# Patient Record
Sex: FEMALE | Race: ASIAN
[De-identification: names, ages, dates, MRNs, and addresses within clinical notes are randomized per-mention and may not be internally consistent; named-entity substitution may affect disease eponyms.]

---

## 2017-05-20 ENCOUNTER — HOSPITAL ENCOUNTER (EMERGENCY)
Dept: HOSPITAL 4 - SED | Age: 82
Discharge: LEFT BEFORE DISCHARGE | End: 2017-05-20
Payer: COMMERCIAL

## 2017-05-20 VITALS — BODY MASS INDEX: 18.65 KG/M2 | WEIGHT: 95 LBS | HEIGHT: 60 IN

## 2017-05-20 VITALS
RESPIRATION RATE: 18 BRPM | OXYGEN SATURATION: 96 % | SYSTOLIC BLOOD PRESSURE: 106 MMHG | HEART RATE: 107 BPM | DIASTOLIC BLOOD PRESSURE: 48 MMHG | TEMPERATURE: 97.4 F

## 2017-05-20 DIAGNOSIS — E11.9: ICD-10-CM

## 2017-05-20 DIAGNOSIS — J44.9: ICD-10-CM

## 2017-05-20 DIAGNOSIS — R53.1: Primary | ICD-10-CM

## 2017-05-20 DIAGNOSIS — Z90.49: ICD-10-CM

## 2018-08-20 ENCOUNTER — HOSPITAL ENCOUNTER (INPATIENT)
Dept: HOSPITAL 4 - SED | Age: 83
LOS: 1 days | Discharge: HOME | DRG: 189 | End: 2018-08-21
Attending: INTERNAL MEDICINE | Admitting: INTERNAL MEDICINE
Payer: COMMERCIAL

## 2018-08-20 VITALS — SYSTOLIC BLOOD PRESSURE: 169 MMHG

## 2018-08-20 VITALS — SYSTOLIC BLOOD PRESSURE: 121 MMHG

## 2018-08-20 VITALS — WEIGHT: 114 LBS | BODY MASS INDEX: 21.52 KG/M2 | HEIGHT: 61 IN

## 2018-08-20 VITALS — SYSTOLIC BLOOD PRESSURE: 126 MMHG

## 2018-08-20 DIAGNOSIS — I11.0: ICD-10-CM

## 2018-08-20 DIAGNOSIS — N39.0: ICD-10-CM

## 2018-08-20 DIAGNOSIS — F03.90: ICD-10-CM

## 2018-08-20 DIAGNOSIS — F32.9: ICD-10-CM

## 2018-08-20 DIAGNOSIS — R65.11: ICD-10-CM

## 2018-08-20 DIAGNOSIS — H91.90: ICD-10-CM

## 2018-08-20 DIAGNOSIS — J96.01: Primary | ICD-10-CM

## 2018-08-20 DIAGNOSIS — J44.1: ICD-10-CM

## 2018-08-20 DIAGNOSIS — Z79.899: ICD-10-CM

## 2018-08-20 DIAGNOSIS — E11.9: ICD-10-CM

## 2018-08-20 DIAGNOSIS — I50.9: ICD-10-CM

## 2018-08-20 DIAGNOSIS — J84.9: ICD-10-CM

## 2018-08-20 DIAGNOSIS — R09.02: ICD-10-CM

## 2018-08-20 DIAGNOSIS — E86.0: ICD-10-CM

## 2018-08-20 LAB
ALBUMIN SERPL BCP-MCNC: 3.3 G/DL (ref 3.4–4.8)
ALT SERPL W P-5'-P-CCNC: 21 U/L (ref 12–78)
ANION GAP SERPL CALCULATED.3IONS-SCNC: 9 MMOL/L (ref 5–15)
APPEARANCE UR: (no result)
AST SERPL W P-5'-P-CCNC: 29 U/L (ref 10–37)
BACTERIA URNS QL MICRO: (no result) /HPF
BASOPHILS # BLD AUTO: 0.1 K/UL (ref 0–0.2)
BASOPHILS NFR BLD AUTO: 0.6 % (ref 0–2)
BILIRUB SERPL-MCNC: 0.2 MG/DL (ref 0–1)
BILIRUB UR QL STRIP: NEGATIVE
BUN SERPL-MCNC: 43 MG/DL (ref 8–21)
CALCIUM SERPL-MCNC: 8.5 MG/DL (ref 8.4–11)
CHLORIDE SERPL-SCNC: 104 MMOL/L (ref 98–107)
COLOR UR: YELLOW
CREAT SERPL-MCNC: 2.25 MG/DL (ref 0.55–1.3)
EOSINOPHIL # BLD AUTO: 0.2 K/UL (ref 0–0.4)
EOSINOPHIL NFR BLD AUTO: 1.8 % (ref 0–4)
ERYTHROCYTE [DISTWIDTH] IN BLOOD BY AUTOMATED COUNT: 12.6 % (ref 9–15)
FINE GRAN CASTS URNS QL MICRO: (no result) /LPF
GFR SERPL CREATININE-BSD FRML MDRD: (no result) ML/MIN (ref 90–?)
GLUCOSE SERPL-MCNC: 236 MG/DL (ref 70–99)
GLUCOSE UR STRIP-MCNC: (no result) MG/DL
HCT VFR BLD AUTO: 33.4 % (ref 36–48)
HGB BLD-MCNC: 10.8 G/DL (ref 12–16)
HGB UR QL STRIP: (no result)
INR PPP: 1 (ref 0.8–1.2)
KETONES UR STRIP-MCNC: NEGATIVE MG/DL
LEUKOCYTE ESTERASE UR QL STRIP: (no result)
LYMPHOCYTES # BLD AUTO: 0.9 K/UL (ref 1–5.5)
LYMPHOCYTES NFR BLD AUTO: 8.5 % (ref 20.5–51.5)
MCH RBC QN AUTO: 27 PG (ref 27–31)
MCHC RBC AUTO-ENTMCNC: 32 % (ref 32–36)
MCV RBC AUTO: 83 FL (ref 79–98)
MONOCYTES # BLD MANUAL: 0.5 K/UL (ref 0–1)
MONOCYTES # BLD MANUAL: 5.3 % (ref 1.7–9.3)
NEUTROPHILS # BLD AUTO: 8.7 K/UL (ref 1.8–7.7)
NEUTROPHILS NFR BLD AUTO: 83.8 % (ref 40–70)
NITRITE UR QL STRIP: NEGATIVE
PH UR STRIP: 6 [PH] (ref 5–8)
PLATELET # BLD AUTO: 325 K/UL (ref 130–430)
POTASSIUM SERPL-SCNC: 4.6 MMOL/L (ref 3.5–5.1)
PROT UR QL STRIP: (no result)
PROTHROMBIN TIME: 10.3 SECS (ref 9.5–12.5)
RBC # BLD AUTO: 4.01 MIL/UL (ref 4.2–6.2)
SODIUM SERPLBLD-SCNC: 137 MMOL/L (ref 136–145)
SP GR UR STRIP: 1.02 (ref 1–1.03)
UROBILINOGEN UR STRIP-MCNC: 0.2 MG/DL (ref 0.2–1)
WBC # BLD AUTO: 10.4 K/UL (ref 4.8–10.8)
WBC #/AREA URNS HPF: >100 /HPF (ref 0–3)

## 2018-08-20 RX ADMIN — Medication SCH MG: at 20:01

## 2018-08-21 VITALS — SYSTOLIC BLOOD PRESSURE: 130 MMHG

## 2018-08-21 VITALS — SYSTOLIC BLOOD PRESSURE: 121 MMHG

## 2018-08-21 VITALS — SYSTOLIC BLOOD PRESSURE: 137 MMHG

## 2018-08-21 LAB
ALBUMIN SERPL BCP-MCNC: 2.8 G/DL (ref 3.4–4.8)
ALT SERPL W P-5'-P-CCNC: 18 U/L (ref 12–78)
ANION GAP SERPL CALCULATED.3IONS-SCNC: 10 MMOL/L (ref 5–15)
AST SERPL W P-5'-P-CCNC: 21 U/L (ref 10–37)
BASOPHILS # BLD AUTO: 0 K/UL (ref 0–0.2)
BASOPHILS NFR BLD AUTO: 0.2 % (ref 0–2)
BILIRUB SERPL-MCNC: 0.2 MG/DL (ref 0–1)
BUN SERPL-MCNC: 49 MG/DL (ref 8–21)
CALCIUM SERPL-MCNC: 8.6 MG/DL (ref 8.4–11)
CHLORIDE SERPL-SCNC: 107 MMOL/L (ref 98–107)
CREAT SERPL-MCNC: 2.29 MG/DL (ref 0.55–1.3)
EOSINOPHIL # BLD AUTO: 0 K/UL (ref 0–0.4)
EOSINOPHIL NFR BLD AUTO: 0.1 % (ref 0–4)
ERYTHROCYTE [DISTWIDTH] IN BLOOD BY AUTOMATED COUNT: 12.8 % (ref 9–15)
GFR SERPL CREATININE-BSD FRML MDRD: (no result) ML/MIN (ref 90–?)
GLUCOSE SERPL-MCNC: 346 MG/DL (ref 70–99)
HCT VFR BLD AUTO: 30.4 % (ref 36–48)
HGB BLD-MCNC: 10.2 G/DL (ref 12–16)
LYMPHOCYTES # BLD AUTO: 0.6 K/UL (ref 1–5.5)
LYMPHOCYTES NFR BLD AUTO: 7.3 % (ref 20.5–51.5)
MCH RBC QN AUTO: 28 PG (ref 27–31)
MCHC RBC AUTO-ENTMCNC: 34 % (ref 32–36)
MCV RBC AUTO: 83 FL (ref 79–98)
MONOCYTES # BLD MANUAL: 0.4 K/UL (ref 0–1)
MONOCYTES # BLD MANUAL: 4.7 % (ref 1.7–9.3)
NEUTROPHILS # BLD AUTO: 7.2 K/UL (ref 1.8–7.7)
NEUTROPHILS NFR BLD AUTO: 87.7 % (ref 40–70)
PLATELET # BLD AUTO: 285 K/UL (ref 130–430)
POTASSIUM SERPL-SCNC: 4.8 MMOL/L (ref 3.5–5.1)
RBC # BLD AUTO: 3.65 MIL/UL (ref 4.2–6.2)
SODIUM SERPLBLD-SCNC: 138 MMOL/L (ref 136–145)
WBC # BLD AUTO: 8.2 K/UL (ref 4.8–10.8)

## 2018-08-21 RX ADMIN — Medication SCH MG: at 08:19

## 2020-02-20 ENCOUNTER — HOSPITAL ENCOUNTER (INPATIENT)
Dept: HOSPITAL 4 - SED | Age: 85
LOS: 4 days | Discharge: NURSING, REHAB OR LONG-TERM CARE | DRG: 871 | End: 2020-02-24
Attending: INTERNAL MEDICINE | Admitting: INTERNAL MEDICINE
Payer: COMMERCIAL

## 2020-02-20 VITALS — HEIGHT: 63 IN | BODY MASS INDEX: 16.3 KG/M2 | WEIGHT: 92 LBS

## 2020-02-20 VITALS — SYSTOLIC BLOOD PRESSURE: 119 MMHG

## 2020-02-20 DIAGNOSIS — I12.9: ICD-10-CM

## 2020-02-20 DIAGNOSIS — E11.22: ICD-10-CM

## 2020-02-20 DIAGNOSIS — E87.1: ICD-10-CM

## 2020-02-20 DIAGNOSIS — Z87.891: ICD-10-CM

## 2020-02-20 DIAGNOSIS — M48.50XA: ICD-10-CM

## 2020-02-20 DIAGNOSIS — L89.159: ICD-10-CM

## 2020-02-20 DIAGNOSIS — J84.9: ICD-10-CM

## 2020-02-20 DIAGNOSIS — N18.9: ICD-10-CM

## 2020-02-20 DIAGNOSIS — L89.609: ICD-10-CM

## 2020-02-20 DIAGNOSIS — F03.90: ICD-10-CM

## 2020-02-20 DIAGNOSIS — A41.9: Primary | ICD-10-CM

## 2020-02-20 DIAGNOSIS — Z79.899: ICD-10-CM

## 2020-02-20 DIAGNOSIS — J69.0: ICD-10-CM

## 2020-02-20 DIAGNOSIS — J43.9: ICD-10-CM

## 2020-02-20 DIAGNOSIS — D64.9: ICD-10-CM

## 2020-02-20 DIAGNOSIS — E46: ICD-10-CM

## 2020-02-20 DIAGNOSIS — E43: ICD-10-CM

## 2020-02-20 LAB
ALBUMIN SERPL BCP-MCNC: 2.9 G/DL (ref 3.4–4.8)
ALT SERPL W P-5'-P-CCNC: 23 U/L (ref 12–78)
ANION GAP SERPL CALCULATED.3IONS-SCNC: 11 MMOL/L (ref 5–15)
AST SERPL W P-5'-P-CCNC: 30 U/L (ref 10–37)
BASOPHILS # BLD AUTO: 0 K/UL (ref 0–0.2)
BASOPHILS NFR BLD AUTO: 0.3 % (ref 0–2)
BILIRUB SERPL-MCNC: 0.3 MG/DL (ref 0–1)
BUN SERPL-MCNC: 51 MG/DL (ref 8–21)
CALCIUM SERPL-MCNC: 8.3 MG/DL (ref 8.4–11)
CHLORIDE SERPL-SCNC: 99 MMOL/L (ref 98–107)
CREAT SERPL-MCNC: 2.16 MG/DL (ref 0.55–1.3)
EOSINOPHIL # BLD AUTO: 0.1 K/UL (ref 0–0.4)
EOSINOPHIL NFR BLD AUTO: 0.7 % (ref 0–4)
ERYTHROCYTE [DISTWIDTH] IN BLOOD BY AUTOMATED COUNT: 13.5 % (ref 9–15)
GFR SERPL CREATININE-BSD FRML MDRD: (no result) ML/MIN (ref 90–?)
GLUCOSE SERPL-MCNC: 151 MG/DL (ref 70–99)
HCT VFR BLD AUTO: 28 % (ref 36–48)
HGB BLD-MCNC: 9.1 G/DL (ref 12–16)
LYMPHOCYTES # BLD AUTO: 1 K/UL (ref 1–5.5)
LYMPHOCYTES NFR BLD AUTO: 8.4 % (ref 20.5–51.5)
MCH RBC QN AUTO: 28 PG (ref 27–31)
MCHC RBC AUTO-ENTMCNC: 33 % (ref 32–36)
MCV RBC AUTO: 86 FL (ref 79–98)
MONOCYTES # BLD MANUAL: 0.7 K/UL (ref 0–1)
MONOCYTES # BLD MANUAL: 5.7 % (ref 1.7–9.3)
NEUTROPHILS # BLD AUTO: 10.5 K/UL (ref 1.8–7.7)
NEUTROPHILS NFR BLD AUTO: 84.9 % (ref 40–70)
PLATELET # BLD AUTO: 320 K/UL (ref 130–430)
POTASSIUM SERPL-SCNC: 4.5 MMOL/L (ref 3.5–5.1)
RBC # BLD AUTO: 3.26 MIL/UL (ref 4.2–6.2)
SODIUM SERPLBLD-SCNC: 130 MMOL/L (ref 136–145)
WBC # BLD AUTO: 12.4 K/UL (ref 4.8–10.8)

## 2020-02-20 PROCEDURE — G0378 HOSPITAL OBSERVATION PER HR: HCPCS

## 2020-02-20 NOTE — NUR
Pt bropught in by ems from [facility]. EMS crew states 
that SNF staff informed them that patient had cough and congestion x5 days. tp 
has no other complaint at this time. Pt had chest xray on 03/12/2020 that 
showed R sided basal infiltrates per EMS crew. Pt awake, alert, confused. Pt 
denies shortness of breath, chest pain, nausea, vomiting, diarrhea, any other 
medical complaint at this time. SNF staff states that patient has not had any 
other unusual behavior or complaints at this time. Pt has rapid respiratory 
rate, with rales in upper airway and lungs. Pt had productive cough upon 
presentation to ED. Pt resting in ED bed. No acute distress. VSS

## 2020-02-21 VITALS — SYSTOLIC BLOOD PRESSURE: 115 MMHG

## 2020-02-21 VITALS — SYSTOLIC BLOOD PRESSURE: 112 MMHG

## 2020-02-21 LAB
ALBUMIN SERPL BCP-MCNC: 2.5 G/DL (ref 3.4–4.8)
ALT SERPL W P-5'-P-CCNC: 17 U/L (ref 12–78)
ANION GAP SERPL CALCULATED.3IONS-SCNC: 13 MMOL/L (ref 5–15)
AST SERPL W P-5'-P-CCNC: 24 U/L (ref 10–37)
BASOPHILS # BLD AUTO: 0 K/UL (ref 0–0.2)
BASOPHILS NFR BLD AUTO: 0.2 % (ref 0–2)
BILIRUB SERPL-MCNC: 0.3 MG/DL (ref 0–1)
BUN SERPL-MCNC: 48 MG/DL (ref 8–21)
CALCIUM SERPL-MCNC: 8 MG/DL (ref 8.4–11)
CHLORIDE SERPL-SCNC: 103 MMOL/L (ref 98–107)
CREAT SERPL-MCNC: 2.02 MG/DL (ref 0.55–1.3)
EOSINOPHIL # BLD AUTO: 0 K/UL (ref 0–0.4)
EOSINOPHIL NFR BLD AUTO: 0.3 % (ref 0–4)
ERYTHROCYTE [DISTWIDTH] IN BLOOD BY AUTOMATED COUNT: 13.3 % (ref 9–15)
GFR SERPL CREATININE-BSD FRML MDRD: (no result) ML/MIN (ref 90–?)
GLUCOSE SERPL-MCNC: 106 MG/DL (ref 70–99)
HCT VFR BLD AUTO: 27 % (ref 36–48)
HGB BLD-MCNC: 8.7 G/DL (ref 12–16)
LYMPHOCYTES # BLD AUTO: 1.1 K/UL (ref 1–5.5)
LYMPHOCYTES NFR BLD AUTO: 8.3 % (ref 20.5–51.5)
MCH RBC QN AUTO: 28 PG (ref 27–31)
MCHC RBC AUTO-ENTMCNC: 32 % (ref 32–36)
MCV RBC AUTO: 86 FL (ref 79–98)
MONOCYTES # BLD MANUAL: 0.8 K/UL (ref 0–1)
MONOCYTES # BLD MANUAL: 5.9 % (ref 1.7–9.3)
NEUTROPHILS # BLD AUTO: 11.4 K/UL (ref 1.8–7.7)
NEUTROPHILS NFR BLD AUTO: 85.3 % (ref 40–70)
PLATELET # BLD AUTO: 311 K/UL (ref 130–430)
POTASSIUM SERPL-SCNC: 4.9 MMOL/L (ref 3.5–5.1)
RBC # BLD AUTO: 3.15 MIL/UL (ref 4.2–6.2)
SODIUM SERPLBLD-SCNC: 134 MMOL/L (ref 136–145)
WBC # BLD AUTO: 13.4 K/UL (ref 4.8–10.8)

## 2020-02-21 RX ADMIN — SODIUM CHLORIDE SCH MLS/HR: 9 INJECTION, SOLUTION INTRAVENOUS at 05:50

## 2020-02-21 RX ADMIN — CEFTRIAXONE SODIUM SCH MLS/HR: 1 INJECTION, SOLUTION INTRAVENOUS at 20:30

## 2020-02-21 RX ADMIN — SODIUM CHLORIDE SCH MLS/HR: 9 INJECTION, SOLUTION INTRAVENOUS at 09:52

## 2020-02-21 RX ADMIN — SODIUM CHLORIDE SCH MLS/HR: 0.9 INJECTION, SOLUTION INTRAVENOUS at 20:31

## 2020-02-21 RX ADMIN — Medication SCH MG: at 09:52

## 2020-02-21 RX ADMIN — DONEPEZIL HYDROCHLORIDE SCH MG: 5 TABLET, FILM COATED ORAL at 20:31

## 2020-02-21 RX ADMIN — Medication SCH EA: at 17:00

## 2020-02-21 RX ADMIN — FAMOTIDINE SCH MG: 20 TABLET, FILM COATED ORAL at 09:52

## 2020-02-21 RX ADMIN — INSULIN LISPRO PRN UNITS: 100 INJECTION, SOLUTION INTRAVENOUS; SUBCUTANEOUS at 20:51

## 2020-02-21 RX ADMIN — Medication SCH EA: at 12:10

## 2020-02-21 RX ADMIN — Medication SCH MG: at 20:31

## 2020-02-21 RX ADMIN — Medication SCH EA: at 07:00

## 2020-02-21 RX ADMIN — SODIUM CHLORIDE SCH MLS/HR: 9 INJECTION, SOLUTION INTRAVENOUS at 19:00

## 2020-02-21 RX ADMIN — METHYLPREDNISOLONE SODIUM SUCCINATE SCH MG: 125 INJECTION, POWDER, FOR SOLUTION INTRAMUSCULAR; INTRAVENOUS at 20:34

## 2020-02-21 RX ADMIN — ALBUTEROL SULFATE SCH MG: 2.5 SOLUTION RESPIRATORY (INHALATION) at 15:29

## 2020-02-21 RX ADMIN — Medication SCH EA: at 20:46

## 2020-02-21 RX ADMIN — ALBUTEROL SULFATE SCH MG: 2.5 SOLUTION RESPIRATORY (INHALATION) at 07:00

## 2020-02-21 NOTE — NUR
ENDORSEMENT

WILL ENDORSE TO NEXT SHIFT CONT CARE , ON LAST ROUNDS  PATIENT NOTED WITH BLACKISH STOOL 
WILL ENDORSED TO NEXT SHIFT TO NOTIFY MD AS NEEDED, PATIENT IS RESTED AT THIS TIME

## 2020-02-21 NOTE — NUR
CONSULTATION PAGED/CALLED

Reason for Consultation: COPD

Person Who was Notified: SPOKE WITH YESSY FROM  OFFICE 

Consulting Physician:  

Consultant Specialty: PULMO

Ordering Physician:

## 2020-02-21 NOTE — NUR
REFUSED BS 

PATIENT REFUSED BLOOD SUGAR CHECK , PATIENT ALSO SCRATCHED AND CURST TO NURSES AND SAYS BAD 
WORDS, REDIRECTED BUT SHE IS VERY QUICK AND SHE SCRATCHED

-------------------------------------------------------------------------------

Addendum: 02/21/20 at 1958 by Jessica Mendenhall RN

-------------------------------------------------------------------------------

DR CUEVA CAME PATIENT AND DISCUSSED PATIENT CONDITION

## 2020-02-21 NOTE — NUR
Patient will be admitted to care of Dr. Munguia.  Admitted to tele unit.  Will 
go to room 103-b.  Belongings list completed.  Complete and up to date summary 
report printed. SBAR report to be given at bedside with opportunity for 
questions.Bedside report given

## 2020-02-21 NOTE — NUR
-------------------------------------------------------------------------------

           *** Note undone in [facility] - 02/21/20 at 0727 by [email or web address] ***            

-------------------------------------------------------------------------------

report received from Jsese NIÑO Pt is in stable condition

## 2020-02-21 NOTE — NUR
RN ADMISSION NOTES 

RECEIVED PATIENT FROM ER , ALERT AWAKE WITH HL TO RT HAND VERBAL NO DISTRESS RESP EVEN WITH 
ON AND OFF NON PRODUCTIVE COUGH MOIST, ABHISHEK 965 

-------------------------------------------------------------------------------

Addendum: 02/21/20 at 1214 by Jessica Mendenhall RN

-------------------------------------------------------------------------------

ERROR FOR SATURATION 

ABHISHEK 96%

## 2020-02-21 NOTE — NUR
S.T. SWALLOW EVAL

SWALLOW EVAL COMPLETED. PT PRESENTS W/ FUNCTIONAL OROPHARYNGEAL SWALLOW FOR PUREE AND 
THIN/THICK LIQUIDS. NO S/S OF ASPIRATION. NOT A CANDIDATE FOR [facility] SOFT OR REGULAR TEXTURED 
DIET AT THIS TIME D/T MISSING UPPER DENTITION AND LOOSE FITTING LOWER DENTURES.

REC: PUREE DIET. THIN LIQUIDS OK. 

NURSE CHUCK NOTIFIED.

## 2020-02-22 VITALS — SYSTOLIC BLOOD PRESSURE: 115 MMHG

## 2020-02-22 VITALS — SYSTOLIC BLOOD PRESSURE: 103 MMHG

## 2020-02-22 VITALS — SYSTOLIC BLOOD PRESSURE: 107 MMHG

## 2020-02-22 VITALS — SYSTOLIC BLOOD PRESSURE: 110 MMHG

## 2020-02-22 LAB
ALBUMIN SERPL BCP-MCNC: 2.3 G/DL (ref 3.4–4.8)
ALT SERPL W P-5'-P-CCNC: 17 U/L (ref 12–78)
ANION GAP SERPL CALCULATED.3IONS-SCNC: 11 MMOL/L (ref 5–15)
AST SERPL W P-5'-P-CCNC: 33 U/L (ref 10–37)
BASOPHILS # BLD AUTO: 0 K/UL (ref 0–0.2)
BASOPHILS NFR BLD AUTO: 0.1 % (ref 0–2)
BILIRUB SERPL-MCNC: 0.2 MG/DL (ref 0–1)
BUN SERPL-MCNC: 42 MG/DL (ref 8–21)
CALCIUM SERPL-MCNC: 7.8 MG/DL (ref 8.4–11)
CHLORIDE SERPL-SCNC: 105 MMOL/L (ref 98–107)
CREAT SERPL-MCNC: 1.86 MG/DL (ref 0.55–1.3)
EOSINOPHIL # BLD AUTO: 0 K/UL (ref 0–0.4)
EOSINOPHIL NFR BLD AUTO: 0 % (ref 0–4)
ERYTHROCYTE [DISTWIDTH] IN BLOOD BY AUTOMATED COUNT: 13.5 % (ref 9–15)
GFR SERPL CREATININE-BSD FRML MDRD: (no result) ML/MIN (ref 90–?)
GLUCOSE SERPL-MCNC: 171 MG/DL (ref 70–99)
HCT VFR BLD AUTO: 25.9 % (ref 36–48)
HGB BLD-MCNC: 8.6 G/DL (ref 12–16)
LYMPHOCYTES # BLD AUTO: 1 K/UL (ref 1–5.5)
LYMPHOCYTES NFR BLD AUTO: 11.3 % (ref 20.5–51.5)
MCH RBC QN AUTO: 29 PG (ref 27–31)
MCHC RBC AUTO-ENTMCNC: 33 % (ref 32–36)
MCV RBC AUTO: 86 FL (ref 79–98)
MONOCYTES # BLD MANUAL: 0.2 K/UL (ref 0–1)
MONOCYTES # BLD MANUAL: 2.1 % (ref 1.7–9.3)
NEUTROPHILS # BLD AUTO: 7.7 K/UL (ref 1.8–7.7)
NEUTROPHILS NFR BLD AUTO: 86.5 % (ref 40–70)
PLATELET # BLD AUTO: 280 K/UL (ref 130–430)
POTASSIUM SERPL-SCNC: 4.7 MMOL/L (ref 3.5–5.1)
RBC # BLD AUTO: 2.99 MIL/UL (ref 4.2–6.2)
SODIUM SERPLBLD-SCNC: 133 MMOL/L (ref 136–145)
WBC # BLD AUTO: 8.9 K/UL (ref 4.8–10.8)

## 2020-02-22 RX ADMIN — FAMOTIDINE SCH MG: 20 TABLET, FILM COATED ORAL at 09:28

## 2020-02-22 RX ADMIN — METHYLPREDNISOLONE SODIUM SUCCINATE SCH MG: 125 INJECTION, POWDER, FOR SOLUTION INTRAMUSCULAR; INTRAVENOUS at 20:55

## 2020-02-22 RX ADMIN — Medication SCH MG: at 09:27

## 2020-02-22 RX ADMIN — Medication SCH EA: at 20:55

## 2020-02-22 RX ADMIN — ALBUTEROL SULFATE SCH MG: 2.5 SOLUTION RESPIRATORY (INHALATION) at 13:29

## 2020-02-22 RX ADMIN — INSULIN LISPRO PRN UNITS: 100 INJECTION, SOLUTION INTRAVENOUS; SUBCUTANEOUS at 06:03

## 2020-02-22 RX ADMIN — Medication SCH EA: at 17:30

## 2020-02-22 RX ADMIN — INSULIN LISPRO PRN UNITS: 100 INJECTION, SOLUTION INTRAVENOUS; SUBCUTANEOUS at 12:40

## 2020-02-22 RX ADMIN — SODIUM CHLORIDE SCH MLS/HR: 0.9 INJECTION, SOLUTION INTRAVENOUS at 21:47

## 2020-02-22 RX ADMIN — ALBUTEROL SULFATE SCH MG: 2.5 SOLUTION RESPIRATORY (INHALATION) at 19:46

## 2020-02-22 RX ADMIN — IPRATROPIUM BROMIDE SCH MG: 0.5 SOLUTION RESPIRATORY (INHALATION) at 13:29

## 2020-02-22 RX ADMIN — INSULIN LISPRO PRN UNITS: 100 INJECTION, SOLUTION INTRAVENOUS; SUBCUTANEOUS at 21:44

## 2020-02-22 RX ADMIN — INSULIN LISPRO PRN UNITS: 100 INJECTION, SOLUTION INTRAVENOUS; SUBCUTANEOUS at 17:32

## 2020-02-22 RX ADMIN — Medication SCH EA: at 12:39

## 2020-02-22 RX ADMIN — ALBUTEROL SULFATE SCH MG: 2.5 SOLUTION RESPIRATORY (INHALATION) at 07:31

## 2020-02-22 RX ADMIN — Medication SCH MG: at 20:56

## 2020-02-22 RX ADMIN — IPRATROPIUM BROMIDE SCH MG: 0.5 SOLUTION RESPIRATORY (INHALATION) at 19:46

## 2020-02-22 RX ADMIN — DONEPEZIL HYDROCHLORIDE SCH MG: 5 TABLET, FILM COATED ORAL at 20:56

## 2020-02-22 RX ADMIN — IPRATROPIUM BROMIDE SCH MG: 0.5 SOLUTION RESPIRATORY (INHALATION) at 07:31

## 2020-02-22 RX ADMIN — METHYLPREDNISOLONE SODIUM SUCCINATE SCH MG: 125 INJECTION, POWDER, FOR SOLUTION INTRAMUSCULAR; INTRAVENOUS at 09:27

## 2020-02-22 RX ADMIN — CEFTRIAXONE SODIUM SCH MLS/HR: 1 INJECTION, SOLUTION INTRAVENOUS at 21:04

## 2020-02-22 RX ADMIN — Medication SCH EA: at 06:00

## 2020-02-22 NOTE — NUR
ASSUMPTION OF CARE:

RECEIVED PT A/A/OX4, DX:INADEQUATE VENTILATION, R/T PNEUMONIA, BREATH SOUNDS ARE RHONCHI, 
BREATHING IS UNLABORED, IV SITE INTACT, PATENT, NO REDNESS OR SWELLING, AFEBRILE, VSS, NO 
INDICATION OF DISTRESS OR DISCOMFORT, RESTING ON AIR MATTRESS, POSITIONED FOR COMFORT, WILL 
CONT' TO MONITOR AND ASSESS.

## 2020-02-22 NOTE — NUR
MED ADMIN:

MORNING MEDS GIVEN, AS PER ORDERED BY M.D., TOLERATED WELL, WILL CONT' TO MONITOR, WILL 
CONT' WITH POC.

## 2020-02-22 NOTE — NUR
END OF SHIFT:

PT HAS LAB RESULT FOR (POSITIVE MRSA NARES), PLACED ON CONTACT ISOLATION, CALL PLACED TO 
M.CHASITY, MESSAGE LEFT, AWAITING CALL BACK.

## 2020-02-22 NOTE — NUR
NURSES NOTES:

PT RESTING IN POSITION OF COMFORT, WAS PARTIALLY BATH, ORAL CARE REFUSED, REPOSITIONED FOR 
COMFORT, WITH PILLOW SUPPORT, WILL CONT' TO MONITOR AND ASSESS.

## 2020-02-22 NOTE — NUR
Dietitian Recommendations



* Recommend mechanical soft, CCHO diet w/ Glucerna TID, Bear BID

(supplements provide an additional ~840 kcal/day, 35 gm protein/day)

VERNA AMBROSIO



Please refer to Nutrition Assessment for details.

-------------------------------------------------------------------------------

Addendum: 02/22/20 at 1250 by Ariadne Chavez RD

-------------------------------------------------------------------------------

Amended: Links added.

## 2020-02-22 NOTE — NUR
Nutrition Update



Joce Scale 11 noted.

Pt admitted for pneumonia.

Diet: mechanical soft

BMI: 16.4 kg/m2



RD to follow per nutrition care standards.

## 2020-02-22 NOTE — NUR
GLUCOSE MONITORING:

BLOOD SUGAR MKRSX=862, 12 UNITS HUMALOG INSULIN GIVEN SQ, TOLERATED WELL, WILL CONT' TO 
MONITOR AND ASSESS.

## 2020-02-22 NOTE — NUR
GLUCOSE MONITORING:

BLOOD SUGAR KIYMR=968, 8 UNITS HUMALOG INSULIN GIVEN SQ, TOLERATED WELL, WILL CONT' TO 
MONITOR AND ASSESS.

## 2020-02-23 VITALS — SYSTOLIC BLOOD PRESSURE: 115 MMHG

## 2020-02-23 VITALS — SYSTOLIC BLOOD PRESSURE: 151 MMHG

## 2020-02-23 VITALS — SYSTOLIC BLOOD PRESSURE: 150 MMHG

## 2020-02-23 VITALS — SYSTOLIC BLOOD PRESSURE: 143 MMHG

## 2020-02-23 VITALS — SYSTOLIC BLOOD PRESSURE: 113 MMHG

## 2020-02-23 RX ADMIN — CEFTRIAXONE SODIUM SCH MLS/HR: 1 INJECTION, SOLUTION INTRAVENOUS at 21:11

## 2020-02-23 RX ADMIN — Medication SCH MG: at 08:33

## 2020-02-23 RX ADMIN — IPRATROPIUM BROMIDE SCH MG: 0.5 SOLUTION RESPIRATORY (INHALATION) at 07:00

## 2020-02-23 RX ADMIN — Medication SCH EA: at 17:30

## 2020-02-23 RX ADMIN — IPRATROPIUM BROMIDE SCH MG: 0.5 SOLUTION RESPIRATORY (INHALATION) at 19:45

## 2020-02-23 RX ADMIN — ALBUTEROL SULFATE SCH MG: 2.5 SOLUTION RESPIRATORY (INHALATION) at 19:45

## 2020-02-23 RX ADMIN — ALBUTEROL SULFATE SCH MG: 2.5 SOLUTION RESPIRATORY (INHALATION) at 00:08

## 2020-02-23 RX ADMIN — DONEPEZIL HYDROCHLORIDE SCH MG: 5 TABLET, FILM COATED ORAL at 21:22

## 2020-02-23 RX ADMIN — ALBUTEROL SULFATE SCH MG: 2.5 SOLUTION RESPIRATORY (INHALATION) at 13:34

## 2020-02-23 RX ADMIN — Medication SCH EA: at 11:54

## 2020-02-23 RX ADMIN — Medication SCH MG: at 21:22

## 2020-02-23 RX ADMIN — INSULIN LISPRO PRN UNITS: 100 INJECTION, SOLUTION INTRAVENOUS; SUBCUTANEOUS at 11:56

## 2020-02-23 RX ADMIN — IPRATROPIUM BROMIDE SCH MG: 0.5 SOLUTION RESPIRATORY (INHALATION) at 00:08

## 2020-02-23 RX ADMIN — ALBUTEROL SULFATE SCH MG: 2.5 SOLUTION RESPIRATORY (INHALATION) at 07:00

## 2020-02-23 RX ADMIN — Medication SCH EA: at 21:19

## 2020-02-23 RX ADMIN — IPRATROPIUM BROMIDE SCH MG: 0.5 SOLUTION RESPIRATORY (INHALATION) at 07:21

## 2020-02-23 RX ADMIN — INSULIN LISPRO PRN UNITS: 100 INJECTION, SOLUTION INTRAVENOUS; SUBCUTANEOUS at 06:20

## 2020-02-23 RX ADMIN — CALCITONIN SALMON SCH ML: 200 SPRAY, METERED NASAL at 11:57

## 2020-02-23 RX ADMIN — INSULIN LISPRO PRN UNITS: 100 INJECTION, SOLUTION INTRAVENOUS; SUBCUTANEOUS at 21:20

## 2020-02-23 RX ADMIN — ALBUTEROL SULFATE SCH MG: 2.5 SOLUTION RESPIRATORY (INHALATION) at 07:21

## 2020-02-23 RX ADMIN — INSULIN LISPRO PRN UNITS: 100 INJECTION, SOLUTION INTRAVENOUS; SUBCUTANEOUS at 17:33

## 2020-02-23 RX ADMIN — FAMOTIDINE SCH MG: 20 TABLET, FILM COATED ORAL at 08:33

## 2020-02-23 RX ADMIN — IPRATROPIUM BROMIDE SCH MG: 0.5 SOLUTION RESPIRATORY (INHALATION) at 13:34

## 2020-02-23 RX ADMIN — SODIUM CHLORIDE SCH MLS/HR: 9 INJECTION, SOLUTION INTRAVENOUS at 06:22

## 2020-02-23 RX ADMIN — Medication SCH EA: at 06:15

## 2020-02-23 RX ADMIN — SODIUM CHLORIDE SCH MLS/HR: 9 INJECTION, SOLUTION INTRAVENOUS at 21:55

## 2020-02-23 RX ADMIN — SODIUM CHLORIDE SCH MLS/HR: 0.9 INJECTION, SOLUTION INTRAVENOUS at 21:55

## 2020-02-23 NOTE — NUR
GLUCOSE MONITORING:

BLOOD SUGAR LEVEL=200, 2 UNITS HUMALOG INSULIN GIVEN SQ, TOLERATED WELL, WILL CONT' TO 
MONITOR AND ASSESS.

## 2020-02-23 NOTE — NUR
VISIT:

 AT BEDSIDE FOR ASSESSMENT OF PT, NEW ORDERS GIVEN, PT IS STABLE AT THIS TIME, WILL 
CONT' TO MONITOR AND ASSESS.

## 2020-02-23 NOTE — NUR
ASSUMPTION OF CARE:

RECEIVED PT A/A/OX4, DX:INADEQUATE VENTILATION, R/T PNEUMONIA, BREATH SOUNDS ARE RHONCHI, 
BREATHING IS UNLABORED, IV SITE INTACT, PATENT, NO REDNESS OR SWELLING, AFEBRILE, VSS, NO 
INDICATION OF DISTRESS OR DISCOMFORT, RESTING IN POSITIONED FOR COMFORT, WILL CONT' TO 
MONITOR AND ASSESS.

## 2020-02-23 NOTE — NUR
PATIENT WAS CLEANED AT THIS TIME. PATIENT TOLERATED WELL. PATIENT IS STABLE. NO S/S OF 
RESPIRATORY DISTRESS NOTED. CALL LIGHT IN REACH. BED IS LOCKED, ALARMED, AND AT THE LOWEST 
POSITION.

## 2020-02-23 NOTE — NUR
GLUCOSE MONITORING:

BLOOD SUGAR LEVEL=251, 6 UNITS HUMALOG INSULIN GIVEN SQ, TOLERATED WELL, WILL CONT' TO 
MONITOR AND ASSESS.

## 2020-02-23 NOTE — NUR
INITIAL NOTES



PATIENT IS LAYING IN BED AND STABLE. NO S/S OF RESPIRATORY DISTRESS NOTED. CALL LIGHT IN 
REACH. BED IS LOCKED, ALARMED,AND AT THE LOWEST POSITION. FALL, SAFETY, ASPIRATION, CONTACT, 
AND RESPIRATORY PRECAUTIONS WILL BE IN PLACE THROUGHOUT THE SHIFT. PLAN OF CARE DISCUSSED 
WITH PATIENT. PATIENT UNSUCCESSFULLY DEMONSTRATES USAGE OF CALL LIGHT AT THIS TIME. WILL 
CONTINUE TO MONITOR FREQUENTLY.

## 2020-02-23 NOTE — NUR
DC to SNF process:

Call and spoke to Glory regarding Dr. Rahman has ordered to DC to SNF. She said that she will 
contact to facility and call back.

## 2020-02-24 VITALS — SYSTOLIC BLOOD PRESSURE: 119 MMHG

## 2020-02-24 VITALS — SYSTOLIC BLOOD PRESSURE: 125 MMHG

## 2020-02-24 VITALS — SYSTOLIC BLOOD PRESSURE: 122 MMHG

## 2020-02-24 LAB
ALBUMIN SERPL BCP-MCNC: 2.5 G/DL (ref 3.4–4.8)
ALT SERPL W P-5'-P-CCNC: 32 U/L (ref 12–78)
ANION GAP SERPL CALCULATED.3IONS-SCNC: 11 MMOL/L (ref 5–15)
AST SERPL W P-5'-P-CCNC: 35 U/L (ref 10–37)
BASOPHILS # BLD AUTO: 0 K/UL (ref 0–0.2)
BASOPHILS NFR BLD AUTO: 0.2 % (ref 0–2)
BILIRUB SERPL-MCNC: 0.2 MG/DL (ref 0–1)
BUN SERPL-MCNC: 43 MG/DL (ref 8–21)
CALCIUM SERPL-MCNC: 7.8 MG/DL (ref 8.4–11)
CHLORIDE SERPL-SCNC: 107 MMOL/L (ref 98–107)
CREAT SERPL-MCNC: 1.8 MG/DL (ref 0.55–1.3)
EOSINOPHIL # BLD AUTO: 0 K/UL (ref 0–0.4)
EOSINOPHIL NFR BLD AUTO: 0.1 % (ref 0–4)
ERYTHROCYTE [DISTWIDTH] IN BLOOD BY AUTOMATED COUNT: 13.7 % (ref 9–15)
GFR SERPL CREATININE-BSD FRML MDRD: (no result) ML/MIN (ref 90–?)
GLUCOSE SERPL-MCNC: 128 MG/DL (ref 70–99)
HCT VFR BLD AUTO: 27.8 % (ref 36–48)
HGB BLD-MCNC: 8.9 G/DL (ref 12–16)
LYMPHOCYTES # BLD AUTO: 1 K/UL (ref 1–5.5)
LYMPHOCYTES NFR BLD AUTO: 10.4 % (ref 20.5–51.5)
MCH RBC QN AUTO: 28 PG (ref 27–31)
MCHC RBC AUTO-ENTMCNC: 32 % (ref 32–36)
MCV RBC AUTO: 86 FL (ref 79–98)
MONOCYTES # BLD MANUAL: 0.5 K/UL (ref 0–1)
MONOCYTES # BLD MANUAL: 5.2 % (ref 1.7–9.3)
NEUTROPHILS # BLD AUTO: 8.5 K/UL (ref 1.8–7.7)
NEUTROPHILS NFR BLD AUTO: 84.1 % (ref 40–70)
PLATELET # BLD AUTO: 375 K/UL (ref 130–430)
POTASSIUM SERPL-SCNC: 4.5 MMOL/L (ref 3.5–5.1)
RBC # BLD AUTO: 3.23 MIL/UL (ref 4.2–6.2)
SODIUM SERPLBLD-SCNC: 137 MMOL/L (ref 136–145)
WBC # BLD AUTO: 10.1 K/UL (ref 4.8–10.8)

## 2020-02-24 RX ADMIN — INSULIN LISPRO PRN UNITS: 100 INJECTION, SOLUTION INTRAVENOUS; SUBCUTANEOUS at 06:08

## 2020-02-24 RX ADMIN — Medication SCH MG: at 08:13

## 2020-02-24 RX ADMIN — ALBUTEROL SULFATE SCH MG: 2.5 SOLUTION RESPIRATORY (INHALATION) at 13:34

## 2020-02-24 RX ADMIN — FAMOTIDINE SCH MG: 20 TABLET, FILM COATED ORAL at 08:13

## 2020-02-24 RX ADMIN — IPRATROPIUM BROMIDE SCH MG: 0.5 SOLUTION RESPIRATORY (INHALATION) at 13:34

## 2020-02-24 RX ADMIN — Medication SCH EA: at 06:06

## 2020-02-24 RX ADMIN — Medication SCH EA: at 16:15

## 2020-02-24 RX ADMIN — INSULIN LISPRO PRN UNITS: 100 INJECTION, SOLUTION INTRAVENOUS; SUBCUTANEOUS at 17:01

## 2020-02-24 RX ADMIN — IPRATROPIUM BROMIDE SCH MG: 0.5 SOLUTION RESPIRATORY (INHALATION) at 01:00

## 2020-02-24 RX ADMIN — ALBUTEROL SULFATE SCH MG: 2.5 SOLUTION RESPIRATORY (INHALATION) at 08:06

## 2020-02-24 RX ADMIN — CALCITONIN SALMON SCH ML: 200 SPRAY, METERED NASAL at 08:13

## 2020-02-24 RX ADMIN — Medication SCH EA: at 11:32

## 2020-02-24 RX ADMIN — ALBUTEROL SULFATE SCH MG: 2.5 SOLUTION RESPIRATORY (INHALATION) at 01:00

## 2020-02-24 RX ADMIN — IPRATROPIUM BROMIDE SCH MG: 0.5 SOLUTION RESPIRATORY (INHALATION) at 08:06

## 2020-02-24 NOTE — NUR
Note

Pt sitting up in bed to eat her breakfast. No SOB/resp or severe pain/discomfort noted at 
this time. IV in right hand intact and patent infusing IVF's well. No needs noted at this 
time. Call light within reach.

## 2020-02-24 NOTE — NUR
Note

Pt's IV was dc'd - site benign. No swelling/redness/bleeding/drainage noted at this time. Pt 
stable. No needs noted at this time. Call light within reach.

## 2020-02-24 NOTE — NUR
Note

Pt resting in bed with IVF's infusing well at this time. No SOB/resp distress or 
pain/discomfort noted at this time. Pt was checked on q1' and PRN all shift for needs and 
care. Pt denies any needs at this time. Call light within reach. 

Pt dressed in orange gown and sheet. Report was given to [facility] RN at 
1810. Discharge packet ready at nurses' station. 

Call light within reach.

## 2020-02-24 NOTE — NUR
PATIENT HAD A BOWEL MOVEMENT. PATIENT WAS CHANGED. PATIENT IS STABLE. NO S/S OF RESPIRATORY 
DISTRESS NOTED. CALL LIGHT IN REACH. BED IS LOCKED, ALARMED, AND AT THE LOWEST POSITION.

## 2020-02-24 NOTE — NUR
Note

Pt finished eating her lunch and now resting in bed. Denies any needs at this time. Call 
light within reach.

## 2020-02-24 NOTE — NUR
TRANSFER

PT BEING TRANSFERRED BY AMBULANCE. NO S/S OF DISTRESS. VSS. PT TAKEN VIA GURNEY. DISCHARGE 
PACKET AND INSTRUCTIONS GIVEN TO PT. ALL BELONGINGS SENT WITH PT.

## 2020-02-24 NOTE — NUR
WOUND EVALUATION:



Wound Consult received from Dr. Munguia. Thank you Dr. Munguia for the consult.



Patient received in a Ervin Bed with an IsoFlex AVRIL mattress, awake, alert, confused. 
Patient is unable to turn in bed independently. Joce Score is a 14. Past Medical History: 
Hypertension, Dementia, Diabetes Mellitus, COPD. Recent Labs: WBC 10.1, RBC 3.23, hemoglobin 
8.9, hematocrit 27.8, BUN 43, creatinine 1.80, glucose 128, POC glucose 216, calcium 7.8, 
albumin 2.5. Microbiology: Blood culture results 2 in progress. MRSA screen results 
positive. Intrinsic factors that delay wound healing: Diabetes Mellitus, COPD. Extrinsic 
factors that delay wound healing: Decreased mobility.





Wound Assessment:



1. Sacral Area: 

sDTI, present on admission. Site has barely blanchable red erythema with dark discoloration. 
No odor, no drainage. Site measures 7.0 cm x 6.0 cm.



Recommend: Cover site with sacral foam dressing. Offload site at all times. Reposition 
patient side to side only every hour.





2. Left Lateral Hip:

Blanchable red erythema, present on admission. No odor, no drainage. Healed wound with scar 
tissue present.



3. Right Lateral Hip:

Blanchable red erythema, present on admission. No odor, no drainage. Dark discoloration and 
scar tissue present.



Recommend: Cover sites with foam dressings. Offload sites at all times. Reposition patient 
side to side only every hour.





4. Left Heel:

sDTI, present on admission. Site has barely blanchable red erythema with dark discoloration. 
No odor, no drainage. 



5. Right Heel:

sDTI, present on admission. Site has barely blanchable red erythema with dark discoloration. 
No odor, no drainage. 



6. Left Lateral Foot:

Blanchable red erythema, present on admission. No odor, no drainage.



7. Right Lateral Foot:

Blanchable red erythema, present on admission. No odor, no drainage.



Recommend: Cover sites with foam dressings. Offload sites at all times. Do not allow heels 
or any portion of feet to touch bed or other surfaces at any time.





8. Right Lateral Ear:

Area of brown discoloration, present on admission.



Recommend: No dressing needed. Offload site at all times. Continue to monitor site qshift.





Also recommend: Reposition patient side to side only every hour with pillow support and 
off-load pressure areas with pillows for pressure re-distribution. Offload, elevate and 
float bilateral heels with one pillow under each extremity at all times. Perform skin care 
and monitor skin integrity Q shift. Use moisture barrier cream on buttocks and other 
moisture susceptible areas QID and as needed for soiling. Initiate low air-loss therapy.

## 2020-02-24 NOTE — NUR
CLOSING NOTES



PATIENT IS STABLE. NO S/S OF RESPIRATORY DISTRESS. CALL LIGHT IN REACH. BED IS LOCKED, 
ALARMED, AND AT THE LOWEST POSITION. FALL, SAFETY, ASPIRATION, CONTACT, AND RESPIRATORY 
PRECAUTIONS HAS BEEN IN PLACE THROUGHOUT THE SHIFT. ALL NEEDS MET. WILL CONTINUE TO MONITOR 
UNTIL REPORT IS GIVEN TO AM NURSE BY BEDSIDE.

## 2020-02-24 NOTE — NUR
Note

Pt was seen and assessed by Dr Munguia, orders written and carried out.  Pt resting in bed. 
No needs noted at this time. Call light within reach.

## 2020-02-24 NOTE — NUR
OPENING NOTE

PATIENT IS RESTING IN BED AND STABLE AT THIS TIME. NO S/S OF RESPIRATORY DISTRESS NOTED. 
CALL LIGHT IN REACH. BED IS LOCKED, ALARMED,AND AT THE LOWEST POSITION. FALL, SAFETY, 
ASPIRATION, CONTACT, AND RESPIRATORY PRECAUTIONS ARE IN PLACE. PLAN OF CARE DISCUSSED WITH 
PATIENT. PATIENT TO BE TRANSFERRED TONIGHT. WILL MONITOR.

## 2022-02-25 NOTE — NUR
PATIENT IS SLEEPING AND STABLE. NO S/S OF RESPIRATORY DISTRESS NOTED. CALL LIGHT IN REACH. 
BED IS LOCKED, ALARMED, AND AT THE LOWEST POSITION. seated/moderate assist (50% patients effort)